# Patient Record
Sex: FEMALE | Race: WHITE | NOT HISPANIC OR LATINO
[De-identification: names, ages, dates, MRNs, and addresses within clinical notes are randomized per-mention and may not be internally consistent; named-entity substitution may affect disease eponyms.]

---

## 2022-09-23 ENCOUNTER — APPOINTMENT (OUTPATIENT)
Dept: ORTHOPEDIC SURGERY | Facility: CLINIC | Age: 67
End: 2022-09-23

## 2022-09-23 VITALS — HEIGHT: 70 IN | WEIGHT: 150 LBS | BODY MASS INDEX: 21.47 KG/M2

## 2022-09-23 DIAGNOSIS — Z78.9 OTHER SPECIFIED HEALTH STATUS: ICD-10-CM

## 2022-09-23 DIAGNOSIS — M75.51 BURSITIS OF RIGHT SHOULDER: ICD-10-CM

## 2022-09-23 PROCEDURE — 99204 OFFICE O/P NEW MOD 45 MIN: CPT

## 2022-09-23 PROCEDURE — 73030 X-RAY EXAM OF SHOULDER: CPT | Mod: RT

## 2022-09-23 PROCEDURE — 73010 X-RAY EXAM OF SHOULDER BLADE: CPT | Mod: RT

## 2022-09-23 RX ORDER — CYCLOBENZAPRINE HYDROCHLORIDE 5 MG/1
5 TABLET, FILM COATED ORAL
Qty: 30 | Refills: 0 | Status: ACTIVE | COMMUNITY
Start: 2022-09-23 | End: 1900-01-01

## 2022-09-23 NOTE — REASON FOR VISIT
[FreeTextEntry2] : 09/23/2022 :SRINIVAS COLE , a 67 year old female, presents today for right shoulder pain, onset 6 months after being pulled by the arm in a  class\par

## 2022-09-23 NOTE — ASSESSMENT
[FreeTextEntry1] : 67f R shoulder impingement / mild DJD\par \par 1) HEP, Flexeril\par 2) activity mod, rest, cryotherapy, NSAIDs\par 3) Discussed poss CSI at fu\par 4) rtc 3 weeks

## 2022-09-23 NOTE — PHYSICAL EXAM
[Right] : right shoulder [Degenerative change] : Degenerative change [] : negative Speed's [de-identified] : + cross body adduction pain [TWNoteComboBox7] : active forward flexion 175 degrees [TWNoteComboBox6] : internal rotation L1 [de-identified] : external rotation 65 degrees

## 2022-09-23 NOTE — HISTORY OF PRESENT ILLNESS
[Gradual] : gradual [Sudden] : sudden [3] : 3 [9] : 9 [Dull/Aching] : dull/aching [Localized] : localized [Radiating] : radiating [Sharp] : sharp [Throbbing] : throbbing [Household chores] : household chores [Leisure] : leisure [Work] : work [Sleep] : sleep [Sexual activity] : sexual activity [Social interactions] : social interactions [Nothing helps with pain getting better] : Nothing helps with pain getting better [Retired] : Work status: retired [de-identified] : 68 yo RHD F here for eval of R shoulder pain for 6 months. Was involved in lifeguard class - arm was pulled incidentally. No h/o shoulder problems. Pain was gradually improving until 5 days ago - pain flared up to become severe after getting covid shot. Pain is anterior/ lateral shoulder. Pain worse at night. Pain radiates down upper arm, occasionally to forearm, No N/T. Taking tylenol prn.\par \par Very active, swimming, walking, resistance training \par \par PMH: No DM/ Cancers/ No blood thinners\par \par Retired - Director at [employer] [] : Patient is currently injured and not playing sports: no

## 2022-10-12 ENCOUNTER — APPOINTMENT (OUTPATIENT)
Dept: POPULATION HEALTH | Facility: CLINIC | Age: 67
End: 2022-10-12

## 2023-05-05 ENCOUNTER — APPOINTMENT (OUTPATIENT)
Dept: ORTHOPEDIC SURGERY | Facility: CLINIC | Age: 68
End: 2023-05-05
Payer: MEDICARE

## 2023-05-05 VITALS — BODY MASS INDEX: 21.47 KG/M2 | HEIGHT: 70 IN | WEIGHT: 150 LBS

## 2023-05-05 DIAGNOSIS — S82.832A OTHER FRACTURE OF UPPER AND LOWER END OF LEFT FIBULA, INITIAL ENCOUNTER FOR CLOSED FRACTURE: ICD-10-CM

## 2023-05-05 PROCEDURE — 27786 TREATMENT OF ANKLE FRACTURE: CPT

## 2023-05-05 PROCEDURE — 99213 OFFICE O/P EST LOW 20 MIN: CPT | Mod: 57

## 2023-05-05 NOTE — DATA REVIEWED
[Outside X-rays] : outside x-rays [Left] : left [Lower Extremities] : lower extremities [Foot] : foot [I independently reviewed and interpreted images and report] : I independently reviewed and interpreted images and report [I reviewed the films/CD and agree] : I reviewed the films/CD and agree [FreeTextEntry1] : 5/4/23: Avulsion fracture to the distal fibula that is not displaced. No acute foot fractures.

## 2023-05-05 NOTE — ASSESSMENT
[FreeTextEntry1] : WBAT in CAM boot, icing and elevation discussed.\par NSAIDS as needed\par f/u 4 weeks \par \par Repeat x-ray will be performed at the next office visit.\par

## 2023-05-05 NOTE — HISTORY OF PRESENT ILLNESS
[5] : 5 [1] : 2 [Dull/Aching] : dull/aching [de-identified] : Pt is a 68 year old F who presents today for evaluation of her left ankle injury. Pt states that she was sitting on her chair, and her foot fell asleep and she had a twisting injury when she got up on 05/04/23. Pain is to the lateral ankle. Had XR taken [facility] which showed fx. Sprained ankle years ago. No numbness/tingling. Ice to affected area. WB in CAM boot.  [] : no [FreeTextEntry1] : Left foot

## 2023-05-05 NOTE — PHYSICAL EXAM
[Left] : left foot and ankle [NL (40)] : plantar flexion 40 degrees [NL 30)] : inversion 30 degrees [5___] : [facility] 5[unfilled]/5 [2+] : posterior tibialis pulse: 2+ [Normal] : saphenous nerve sensation normal [Moderate] : moderate diffused ankle swelling [4___] : eversion 4[unfilled]/5 [] : non-antalgic [de-identified] : WB in CAM boot [de-identified] : eversion 15 degrees [TWNoteComboBox7] : dorsiflexion 15 degrees

## 2023-05-06 ENCOUNTER — TRANSCRIPTION ENCOUNTER (OUTPATIENT)
Age: 68
End: 2023-05-06

## 2023-06-02 ENCOUNTER — APPOINTMENT (OUTPATIENT)
Dept: ORTHOPEDIC SURGERY | Facility: CLINIC | Age: 68
End: 2023-06-02
Payer: MEDICARE

## 2023-06-02 VITALS — BODY MASS INDEX: 21.47 KG/M2 | WEIGHT: 150 LBS | HEIGHT: 70 IN

## 2023-06-02 PROCEDURE — 73610 X-RAY EXAM OF ANKLE: CPT | Mod: LT

## 2023-06-02 PROCEDURE — 99024 POSTOP FOLLOW-UP VISIT: CPT

## 2023-06-02 NOTE — PHYSICAL EXAM
[NL (40)] : plantar flexion 40 degrees [NL 30)] : inversion 30 degrees [2+] : posterior tibialis pulse: 2+ [Normal] : saphenous nerve sensation normal [Left] : left ankle [NL (20)] : eversion 20 degrees [5___] : eversion 5[unfilled]/5 [] : non-antalgic [de-identified] : WB in CAM boot [FreeTextEntry9] : There is a distal fibula avulsion fracture in appropriate alignment with some reuptake. No change in alignment.  [de-identified] : eversion 15 degrees [TWNoteComboBox7] : dorsiflexion 15 degrees

## 2023-06-02 NOTE — ASSESSMENT
[FreeTextEntry1] : The distal fibula avulsion fracture is not healed yet.\par Continue WBAT in CAM boot.\par Icing and elevation.\par \par Patient was instructed that they can not operate an automatic vehicle while wearing a CAM boot or cast on the right lower extremity. If operating a vehicle that requires use of a clutch, patient may not drive while wearing a CAM boot or cast on the left lower extremity.\par \par \par Repeat x-ray will be performed at the next office visit. (Left ankle)

## 2023-06-02 NOTE — HISTORY OF PRESENT ILLNESS
[2] : 2 [1] : 2 [Dull/Aching] : dull/aching [de-identified] : Pt is a 68 year old F who presents today for evaluation of her left distal fibula avulsion fracture sustain on 5/4/23. Pt states that she was sitting on her chair, and her foot fell asleep and she had a twisting injury. Pain is to the lateral ankle. Had XR taken [facility] which showed fx. Sprained ankle years ago. No numbness/tingling. Ice to affected area. WB in CAM boot. Pain is improved. \par \par  [] : no [FreeTextEntry1] : Left foot

## 2023-06-16 ENCOUNTER — APPOINTMENT (OUTPATIENT)
Dept: ORTHOPEDIC SURGERY | Facility: CLINIC | Age: 68
End: 2023-06-16
Payer: MEDICARE

## 2023-06-16 PROCEDURE — 99024 POSTOP FOLLOW-UP VISIT: CPT

## 2023-06-16 PROCEDURE — L4350: CPT | Mod: KX,LT

## 2023-06-16 PROCEDURE — 73610 X-RAY EXAM OF ANKLE: CPT | Mod: LT

## 2023-06-16 NOTE — PHYSICAL EXAM
[NL (40)] : plantar flexion 40 degrees [NL 30)] : inversion 30 degrees [NL (20)] : eversion 20 degrees [5___] : eversion 5[unfilled]/5 [2+] : posterior tibialis pulse: 2+ [Normal] : saphenous nerve sensation normal [Left] : left ankle [] : no pain when stressing lateral tarsal metatarsal joint [FreeTextEntry8] : Mild tenderness.  [de-identified] : WB in CAM boot [FreeTextEntry9] : There is a distal fibula avulsion fracture in appropriate alignment with healing noted.  [TWNoteComboBox7] : dorsiflexion 15 degrees

## 2023-06-16 NOTE — ASSESSMENT
[FreeTextEntry1] : D/C the CAM boot and transition to supportive sneaker with airsport brace.\par Ankle ROM exercises.\par Ice to affected area.\par \par Repeat x-ray will be performed at the next office visit. (Left ankle)

## 2023-06-16 NOTE — HISTORY OF PRESENT ILLNESS
[0] : 0 [de-identified] : Pt is a 68 year old F who presents today for f/u of her left distal fibula avulsion fracture sustained on 5/4/23. She contiues to WB in CAM boot and reports further improvement. No issues since last visit. \par  [] : no [FreeTextEntry1] : Left foot

## 2023-07-06 ENCOUNTER — APPOINTMENT (OUTPATIENT)
Dept: ORTHOPEDIC SURGERY | Facility: CLINIC | Age: 68
End: 2023-07-06

## 2023-07-12 ENCOUNTER — APPOINTMENT (OUTPATIENT)
Dept: ORTHOPEDIC SURGERY | Facility: CLINIC | Age: 68
End: 2023-07-12

## 2023-08-02 ENCOUNTER — APPOINTMENT (OUTPATIENT)
Dept: ORTHOPEDIC SURGERY | Facility: CLINIC | Age: 68
End: 2023-08-02
Payer: MEDICARE

## 2023-08-02 DIAGNOSIS — Z00.00 ENCOUNTER FOR GENERAL ADULT MEDICAL EXAMINATION W/OUT ABNORMAL FINDINGS: ICD-10-CM

## 2023-08-02 PROCEDURE — 99024 POSTOP FOLLOW-UP VISIT: CPT

## 2023-08-02 PROCEDURE — 73610 X-RAY EXAM OF ANKLE: CPT | Mod: LT

## 2023-08-02 NOTE — PHYSICAL EXAM
[NL (40)] : plantar flexion 40 degrees [NL 30)] : inversion 30 degrees [NL (20)] : eversion 20 degrees [5___] : eversion 5[unfilled]/5 [2+] : posterior tibialis pulse: 2+ [Normal] : saphenous nerve sensation normal [Left] : left ankle [] : non-antalgic [FreeTextEntry8] : Mild tenderness.  [FreeTextEntry9] : There is a distal fibula avulsion fracture in appropriate alignment with healing noted.  [de-identified] : WB with ankle brace.  [TWNoteComboBox7] : dorsiflexion 15 degrees DC instructions

## 2023-08-02 NOTE — ASSESSMENT
[FreeTextEntry1] : WB in  supportive sneakers. Airsport brace when going for walks. Ankle ROM exercises. Ice to affected area.  Repeat x-ray will be performed at the next office visit. (Left ankle)

## 2023-08-14 NOTE — HISTORY OF PRESENT ILLNESS
[0] : 0 [de-identified] : Pt is a 68 year old F who presents today for f/u of her left distal fibula avulsion fracture sustained on 5/4/23. She has been WB in supportive footwear, using ankle brace. She reports doing well. Some intermittent soreness. She walks about 1-2 miles a day and uses brace.  [] : no [FreeTextEntry1] : Left foot  [Follow-up] : a follow-up of an existing diagnosis [FreeTextEntry1] : Hx of colon polyp with HGD, Hx of throat cancer

## 2023-09-27 ENCOUNTER — APPOINTMENT (OUTPATIENT)
Dept: ORTHOPEDIC SURGERY | Facility: CLINIC | Age: 68
End: 2023-09-27
Payer: MEDICARE

## 2023-09-27 DIAGNOSIS — S82.832D OTHER FRACTURE OF UPPER AND LOWER END OF LEFT FIBULA, SUBSEQUENT ENCOUNTER FOR CLOSED FRACTURE WITH ROUTINE HEALING: ICD-10-CM

## 2023-09-27 PROCEDURE — 73610 X-RAY EXAM OF ANKLE: CPT | Mod: LT

## 2023-09-27 PROCEDURE — 99213 OFFICE O/P EST LOW 20 MIN: CPT

## 2024-06-27 ENCOUNTER — OUTPATIENT (OUTPATIENT)
Dept: OUTPATIENT SERVICES | Facility: HOSPITAL | Age: 69
LOS: 1 days | End: 2024-06-27
Payer: MEDICARE

## 2024-06-27 ENCOUNTER — APPOINTMENT (OUTPATIENT)
Dept: RADIOLOGY | Facility: CLINIC | Age: 69
End: 2024-06-27
Payer: MEDICARE

## 2024-06-27 DIAGNOSIS — Z00.8 ENCOUNTER FOR OTHER GENERAL EXAMINATION: ICD-10-CM

## 2024-06-27 PROCEDURE — 71046 X-RAY EXAM CHEST 2 VIEWS: CPT | Mod: 26

## 2024-06-27 PROCEDURE — 71046 X-RAY EXAM CHEST 2 VIEWS: CPT

## 2025-04-21 ENCOUNTER — OUTPATIENT (OUTPATIENT)
Dept: OUTPATIENT SERVICES | Facility: HOSPITAL | Age: 70
LOS: 1 days | End: 2025-04-21
Payer: MEDICARE

## 2025-04-21 ENCOUNTER — APPOINTMENT (OUTPATIENT)
Dept: RADIOLOGY | Facility: CLINIC | Age: 70
End: 2025-04-21
Payer: MEDICARE

## 2025-04-21 DIAGNOSIS — Z00.8 ENCOUNTER FOR OTHER GENERAL EXAMINATION: ICD-10-CM

## 2025-04-21 PROCEDURE — 71046 X-RAY EXAM CHEST 2 VIEWS: CPT | Mod: 26

## 2025-04-21 PROCEDURE — 71046 X-RAY EXAM CHEST 2 VIEWS: CPT

## 2025-05-08 ENCOUNTER — OUTPATIENT (OUTPATIENT)
Dept: OUTPATIENT SERVICES | Facility: HOSPITAL | Age: 70
LOS: 1 days | End: 2025-05-08
Payer: MEDICARE

## 2025-05-08 ENCOUNTER — APPOINTMENT (OUTPATIENT)
Dept: RADIOLOGY | Facility: CLINIC | Age: 70
End: 2025-05-08
Payer: MEDICARE

## 2025-05-08 DIAGNOSIS — Z00.8 ENCOUNTER FOR OTHER GENERAL EXAMINATION: ICD-10-CM

## 2025-05-08 PROCEDURE — 71046 X-RAY EXAM CHEST 2 VIEWS: CPT

## 2025-05-08 PROCEDURE — 71046 X-RAY EXAM CHEST 2 VIEWS: CPT | Mod: 26
